# Patient Record
Sex: FEMALE | Race: WHITE | NOT HISPANIC OR LATINO
[De-identification: names, ages, dates, MRNs, and addresses within clinical notes are randomized per-mention and may not be internally consistent; named-entity substitution may affect disease eponyms.]

---

## 2017-08-23 ENCOUNTER — TRANSCRIPTION ENCOUNTER (OUTPATIENT)
Age: 47
End: 2017-08-23

## 2017-08-30 ENCOUNTER — RESULT REVIEW (OUTPATIENT)
Age: 47
End: 2017-08-30

## 2017-09-05 ENCOUNTER — EMERGENCY (EMERGENCY)
Facility: HOSPITAL | Age: 47
LOS: 1 days | End: 2017-09-05
Payer: COMMERCIAL

## 2017-09-05 PROCEDURE — 99285 EMERGENCY DEPT VISIT HI MDM: CPT | Mod: 25

## 2017-09-05 PROCEDURE — 74176 CT ABD & PELVIS W/O CONTRAST: CPT | Mod: 26

## 2018-09-10 ENCOUNTER — APPOINTMENT (OUTPATIENT)
Dept: OBGYN | Facility: CLINIC | Age: 48
End: 2018-09-10

## 2019-07-08 ENCOUNTER — TRANSCRIPTION ENCOUNTER (OUTPATIENT)
Age: 49
End: 2019-07-08

## 2019-10-28 ENCOUNTER — APPOINTMENT (OUTPATIENT)
Dept: ULTRASOUND IMAGING | Facility: CLINIC | Age: 49
End: 2019-10-28

## 2019-12-02 ENCOUNTER — APPOINTMENT (OUTPATIENT)
Dept: ORTHOPEDIC SURGERY | Facility: CLINIC | Age: 49
End: 2019-12-02
Payer: COMMERCIAL

## 2019-12-02 DIAGNOSIS — Z78.9 OTHER SPECIFIED HEALTH STATUS: ICD-10-CM

## 2019-12-02 DIAGNOSIS — Z72.3 LACK OF PHYSICAL EXERCISE: ICD-10-CM

## 2019-12-02 PROCEDURE — 99204 OFFICE O/P NEW MOD 45 MIN: CPT

## 2019-12-02 NOTE — PHYSICAL EXAM
[de-identified] : General: Alert and oriented x3. In no acute distress. Pleasant in nature with a normal affect. No apparent respiratory distress.\par L Ankle Exam\par Skin: Clean, dry, intact\par Inspection: No defect, No obvious malalignment, no swelling, no effusion; no lymphadenopathy\par Pulses: 2+ DP/PT pulses\par ROM: L Ankle neutral dorsiflexion, 40 degrees of plantarflexion, 10 degrees of subtalar motion\par Tenderness: +anterior lateral gutter, +mid substance Achilles, No tenderness over the lateral malleolus, no CFL/ATFL/PTFL pain. No medial malleolus pain, no deltoid ligament pain. No proximal fibular pain. No heel pain.\par Stability: 2+ anterior drawer\par Strength: 5/5 TA/GS/EHL\par Neuro: In tact to light touch throughout\par Additional tests: Negative Mortons test, Negative syndesmosis squeeze test. 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 \par \par \par  \par \par

## 2019-12-02 NOTE — DISCUSSION/SUMMARY
[de-identified] : Today I had a lengthy discussion with the patient regarding their left ankle pain. I have addressed all the patient's concerns surrounding the pathology of their condition. The patient's MRI was reviewed and all questions were answered. I recommend the patient undergo a course of physical therapy for the left ankle 2-3 times a week for a total of 6-8 weeks. A medical of medical necessity was given for physical therapy today. A discussion was had about an arthroscopy and ligament repair if her symptoms worsen or do not improve with conservative management alone. The patient was also advised to begin OTC dorsal hybrid night splint to facilitate stretching. I would like to see the patient back in the office in 2 months to reassess their condition. The patient understood and verbally agreed to the treatment plan. All of their questions were answered and they were satisfied with the visit. The patient should call the office if they have any questions or experience worsening symptoms.\par \par \par

## 2019-12-02 NOTE — ADDENDUM
[FreeTextEntry1] : Documented by Rashmi Rodriguez acting solely as a scribe for Dr. Wes Perry on this date 12/02/2019 .\par \par All medical record entries made by the Scribe were at my, Dr. Wes Perry, direction and personally dictated by me on 12/02/2019 . I have reviewed the chart and agree that the record accurately reflects my personal performance of the history, physical exam, assessment and plan. I have also personally directed, reviewed, and agreed with the chart.\par \par \par

## 2019-12-02 NOTE — HISTORY OF PRESENT ILLNESS
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 She notes that the night after the injury she was unable to place weight on the foot. She was two days later by Dr. Munoz where xrays were taken of the left ankle and she was placed into a brace full-time for 6 weeks. She then started physical therapy without much improvement and returned to Dr. Munoz where an MRI of the left ankle was ordered. She was then seen at [facility] in [facility] by Dr. Goodwin who stated that she needed surgical intervention. The patient’s pain is noted to be a 5-7/10. She describes the pain as achy and cramping in quality. She is here today for a second opinion. She has done 5 weeks of physical therapy since July 2019. She is currently taking no pain medication. No other complaints at this time.\par \par \par

## 2020-02-10 ENCOUNTER — APPOINTMENT (OUTPATIENT)
Dept: ORTHOPEDIC SURGERY | Facility: CLINIC | Age: 50
End: 2020-02-10
Payer: COMMERCIAL

## 2020-02-10 PROCEDURE — 99214 OFFICE O/P EST MOD 30 MIN: CPT

## 2020-02-23 DIAGNOSIS — Z01.818 ENCOUNTER FOR OTHER PREPROCEDURAL EXAMINATION: ICD-10-CM

## 2020-02-28 ENCOUNTER — EMERGENCY (EMERGENCY)
Facility: HOSPITAL | Age: 50
LOS: 1 days | End: 2020-02-28
Admitting: EMERGENCY MEDICINE
Payer: COMMERCIAL

## 2020-02-28 PROCEDURE — 99285 EMERGENCY DEPT VISIT HI MDM: CPT

## 2020-02-28 PROCEDURE — 71045 X-RAY EXAM CHEST 1 VIEW: CPT | Mod: 26

## 2020-03-06 NOTE — DISCUSSION/SUMMARY
[de-identified] : Today I had a lengthy discussion with the patient regarding her left ankle pain. I have addressed all the patient's concerns surrounding the pathology of her condition. A discussion was had about a left ankle arthroscopy and ligament repair surgery. A lengthy discussion was had about the surgery. All risks, benefits and alternatives to the recommended surgical procedure were discussed which include but are not limited to bleeding, infection, nerve damage, vascular damage, failure of the wound to heal, the need for further surgery, loss of limb, DVT, PE, loss of life as well as the risks associated with general anesthesia. The patient verbalized understanding and provided informed consent to move forward with surgery. The patient understood and verbally agreed to the treatment plan. All of her questions were answered and she was satisfied with the visit. The patient should call the office if she has any questions or experience worsening symptoms.

## 2020-03-06 NOTE — PHYSICAL EXAM
[de-identified] : General: Alert and oriented x3. In no acute distress. Pleasant in nature with a normal affect. No apparent respiratory distress.\par L Ankle Exam\par Skin: Clean, dry, intact\par Inspection: No defect, No obvious malalignment, no swelling, no effusion; no lymphadenopathy\par Pulses: 2+ DP/PT pulses\par ROM: L Ankle neutral dorsiflexion, 40 degrees of plantarflexion, 10 degrees of subtalar motion\par Tenderness: +anterior lateral gutter, +mid substance Achilles, No tenderness over the lateral malleolus, no CFL/ATFL/PTFL pain. No medial malleolus pain, no deltoid ligament pain. No proximal fibular pain. No heel pain.\par Stability: 2+ anterior drawer. Negative posterior drawer.\par Strength: 5/5 TA/GS/EHL\par Neuro: In tact to light touch throughout\par Additional tests: Negative Mortons test, Negative syndesmosis squeeze test. [de-identified] : None new obtained.

## 2020-03-06 NOTE — ADDENDUM
[FreeTextEntry1] : I, Russell Yeboah, acted solely as a scribe for Dr. Wes Perry on this date 02/10/2020. \par \par All medical record entries made by the Scribe were at my, Dr. Wes Perry, direction and personally dictated by me on 02/10/2020 . I have reviewed the chart and agree that the record accurately reflects my personal performance of the history, physical exam, assessment and plan. I have also personally directed, reviewed, and agreed with the chart.\par \par \par

## 2020-03-06 NOTE — HISTORY OF PRESENT ILLNESS
[FreeTextEntry1] : 49 year old female presenting with left ankle pain. The patient’s pain is noted to be a 8/10. The pain and swelling are both noted to be the same compared to the previous visit. The patient reports that she rolled her ankle twice since the last visit. Over the past three months, the patient has performed a 6 week home exercise program which consisted of strengthening and stretching, as well as 4 weeks of oral anti-inflammatory use and Tylenol without relief. The patient is here today after failing conservative management.  She is currently taking no pain medication. No other complaints at this time.

## 2020-03-10 NOTE — ASU PATIENT PROFILE, ADULT - PMH
Arthritis    Back pain    Cervical stenosis of spine    Diverticulitis    GERD (gastroesophageal reflux disease)    H/O degenerative disc disease    H/o Lyme disease    H/O radiculopathy    History of palpitations

## 2020-03-11 ENCOUNTER — APPOINTMENT (OUTPATIENT)
Dept: ORTHOPEDIC SURGERY | Facility: HOSPITAL | Age: 50
End: 2020-03-11

## 2020-03-11 ENCOUNTER — OUTPATIENT (OUTPATIENT)
Dept: INPATIENT UNIT | Facility: HOSPITAL | Age: 50
LOS: 1 days | Discharge: HOME | End: 2020-03-11
Payer: COMMERCIAL

## 2020-03-11 VITALS
OXYGEN SATURATION: 100 % | DIASTOLIC BLOOD PRESSURE: 83 MMHG | SYSTOLIC BLOOD PRESSURE: 129 MMHG | WEIGHT: 139.99 LBS | RESPIRATION RATE: 14 BRPM | HEART RATE: 84 BPM | TEMPERATURE: 98 F | HEIGHT: 61 IN

## 2020-03-11 VITALS
SYSTOLIC BLOOD PRESSURE: 110 MMHG | RESPIRATION RATE: 20 BRPM | DIASTOLIC BLOOD PRESSURE: 60 MMHG | OXYGEN SATURATION: 100 % | HEART RATE: 82 BPM

## 2020-03-11 DIAGNOSIS — S99.912D UNSPECIFIED INJURY OF LEFT ANKLE, SUBSEQUENT ENCOUNTER: ICD-10-CM

## 2020-03-11 DIAGNOSIS — M25.372 OTHER INSTABILITY, LEFT ANKLE: ICD-10-CM

## 2020-03-11 LAB — HCG UR QL: NEGATIVE — SIGNIFICANT CHANGE UP

## 2020-03-11 PROCEDURE — 29898 ANKLE ARTHROSCOPY/SURGERY: CPT | Mod: LT

## 2020-03-11 PROCEDURE — 27698 REPAIR OF ANKLE LIGAMENT: CPT | Mod: LT

## 2020-03-11 PROCEDURE — 81025 URINE PREGNANCY TEST: CPT

## 2020-03-11 PROCEDURE — C1713: CPT

## 2020-03-11 RX ORDER — OXYCODONE HYDROCHLORIDE 5 MG/1
5 TABLET ORAL ONCE
Refills: 0 | Status: DISCONTINUED | OUTPATIENT
Start: 2020-03-11 | End: 2020-03-11

## 2020-03-11 RX ORDER — FAMOTIDINE 10 MG/ML
20 INJECTION INTRAVENOUS ONCE
Refills: 0 | Status: COMPLETED | OUTPATIENT
Start: 2020-03-11 | End: 2020-03-11

## 2020-03-11 RX ORDER — ONDANSETRON 8 MG/1
4 TABLET, FILM COATED ORAL ONCE
Refills: 0 | Status: COMPLETED | OUTPATIENT
Start: 2020-03-11 | End: 2020-03-11

## 2020-03-11 RX ORDER — ACETAMINOPHEN 500 MG
975 TABLET ORAL ONCE
Refills: 0 | Status: COMPLETED | OUTPATIENT
Start: 2020-03-11 | End: 2020-03-11

## 2020-03-11 RX ORDER — OXYCODONE HYDROCHLORIDE 5 MG/1
1 TABLET ORAL
Qty: 18 | Refills: 0
Start: 2020-03-11 | End: 2020-03-13

## 2020-03-11 RX ORDER — HYDROMORPHONE HYDROCHLORIDE 2 MG/ML
0.5 INJECTION INTRAMUSCULAR; INTRAVENOUS; SUBCUTANEOUS
Refills: 0 | Status: DISCONTINUED | OUTPATIENT
Start: 2020-03-11 | End: 2020-03-11

## 2020-03-11 RX ORDER — FENTANYL CITRATE 50 UG/ML
50 INJECTION INTRAVENOUS
Refills: 0 | Status: DISCONTINUED | OUTPATIENT
Start: 2020-03-11 | End: 2020-03-11

## 2020-03-11 RX ORDER — ASPIRIN/CALCIUM CARB/MAGNESIUM 324 MG
1 TABLET ORAL
Qty: 30 | Refills: 0
Start: 2020-03-11 | End: 2020-04-09

## 2020-03-11 RX ORDER — SODIUM CHLORIDE 9 MG/ML
1000 INJECTION, SOLUTION INTRAVENOUS
Refills: 0 | Status: DISCONTINUED | OUTPATIENT
Start: 2020-03-11 | End: 2020-03-11

## 2020-03-11 RX ORDER — MECLIZINE HCL 12.5 MG
1 TABLET ORAL
Qty: 0 | Refills: 0 | DISCHARGE

## 2020-03-11 RX ADMIN — Medication 975 MILLIGRAM(S): at 13:49

## 2020-03-11 RX ADMIN — OXYCODONE HYDROCHLORIDE 5 MILLIGRAM(S): 5 TABLET ORAL at 19:18

## 2020-03-11 RX ADMIN — FENTANYL CITRATE 50 MICROGRAM(S): 50 INJECTION INTRAVENOUS at 19:17

## 2020-03-11 RX ADMIN — FENTANYL CITRATE 50 MICROGRAM(S): 50 INJECTION INTRAVENOUS at 19:45

## 2020-03-11 RX ADMIN — OXYCODONE HYDROCHLORIDE 5 MILLIGRAM(S): 5 TABLET ORAL at 19:45

## 2020-03-11 RX ADMIN — FAMOTIDINE 20 MILLIGRAM(S): 10 INJECTION INTRAVENOUS at 13:49

## 2020-03-11 RX ADMIN — ONDANSETRON 4 MILLIGRAM(S): 8 TABLET, FILM COATED ORAL at 19:45

## 2020-03-11 NOTE — ASU DISCHARGE PLAN (ADULT/PEDIATRIC) - ASU DC SPECIAL INSTRUCTIONSFT
Follow up with Dr. Perry in 7-10 days. Call office for appointment. Take medications as prescribed. Keep dressing clean, dry, and intact. Rest, ice, and elevate affected extremity. Non weight bearing Left lower extremity in splint. Keep splint clean and dry.

## 2020-03-11 NOTE — BRIEF OPERATIVE NOTE - NSICDXBRIEFPREOP_GEN_ALL_CORE_FT
PRE-OP DIAGNOSIS:  Sprain of anterior talofibular ligament, left, subsequent encounter 11-Mar-2020 19:26:19  Wilfred Farrar

## 2020-03-11 NOTE — ASU DISCHARGE PLAN (ADULT/PEDIATRIC) - CARE PROVIDER_API CALL
Wes Perry (DO)  Orthopaedic Surgery  [address] [place] [address]  Phone: [phone]  Fax: [phone]  Follow Up Time:

## 2020-03-11 NOTE — BRIEF OPERATIVE NOTE - NSICDXBRIEFPOSTOP_GEN_ALL_CORE_FT
POST-OP DIAGNOSIS:  Sprain of anterior talofibular ligament of left ankle, subsequent encounter 11-Mar-2020 19:26:32  Wilfred Farrar

## 2020-03-16 DIAGNOSIS — M24.272 DISORDER OF LIGAMENT, LEFT ANKLE: ICD-10-CM

## 2020-03-16 DIAGNOSIS — M25.872 OTHER SPECIFIED JOINT DISORDERS, LEFT ANKLE AND FOOT: ICD-10-CM

## 2020-03-16 DIAGNOSIS — M24.172 OTHER ARTICULAR CARTILAGE DISORDERS, LEFT ANKLE: ICD-10-CM

## 2020-03-16 DIAGNOSIS — Z87.891 PERSONAL HISTORY OF NICOTINE DEPENDENCE: ICD-10-CM

## 2020-03-16 DIAGNOSIS — Z91.018 ALLERGY TO OTHER FOODS: ICD-10-CM

## 2020-03-16 DIAGNOSIS — Z91.041 RADIOGRAPHIC DYE ALLERGY STATUS: ICD-10-CM

## 2020-03-16 DIAGNOSIS — M65.9 SYNOVITIS AND TENOSYNOVITIS, UNSPECIFIED: ICD-10-CM

## 2020-03-23 PROBLEM — Z86.69 PERSONAL HISTORY OF OTHER DISEASES OF THE NERVOUS SYSTEM AND SENSE ORGANS: Chronic | Status: ACTIVE | Noted: 2020-03-10

## 2020-03-23 PROBLEM — Z86.19 PERSONAL HISTORY OF OTHER INFECTIOUS AND PARASITIC DISEASES: Chronic | Status: ACTIVE | Noted: 2020-03-10

## 2020-03-23 PROBLEM — M48.02 SPINAL STENOSIS, CERVICAL REGION: Chronic | Status: ACTIVE | Noted: 2020-03-10

## 2020-03-23 PROBLEM — K57.92 DIVERTICULITIS OF INTESTINE, PART UNSPECIFIED, WITHOUT PERFORATION OR ABSCESS WITHOUT BLEEDING: Chronic | Status: ACTIVE | Noted: 2020-03-10

## 2020-03-23 PROBLEM — K21.9 GASTRO-ESOPHAGEAL REFLUX DISEASE WITHOUT ESOPHAGITIS: Chronic | Status: ACTIVE | Noted: 2020-03-10

## 2020-03-23 PROBLEM — Z87.898 PERSONAL HISTORY OF OTHER SPECIFIED CONDITIONS: Chronic | Status: ACTIVE | Noted: 2020-03-10

## 2020-03-23 PROBLEM — Z87.39 PERSONAL HISTORY OF OTHER DISEASES OF THE MUSCULOSKELETAL SYSTEM AND CONNECTIVE TISSUE: Chronic | Status: ACTIVE | Noted: 2020-03-10

## 2020-03-23 PROBLEM — M54.9 DORSALGIA, UNSPECIFIED: Chronic | Status: ACTIVE | Noted: 2020-03-10

## 2020-03-23 PROBLEM — M19.90 UNSPECIFIED OSTEOARTHRITIS, UNSPECIFIED SITE: Chronic | Status: ACTIVE | Noted: 2020-03-10

## 2020-03-25 ENCOUNTER — APPOINTMENT (OUTPATIENT)
Dept: ORTHOPEDIC SURGERY | Facility: CLINIC | Age: 50
End: 2020-03-25
Payer: COMMERCIAL

## 2020-03-25 PROCEDURE — 73610 X-RAY EXAM OF ANKLE: CPT | Mod: LT

## 2020-03-25 PROCEDURE — 97760 ORTHOTIC MGMT&TRAING 1ST ENC: CPT | Mod: 58

## 2020-03-25 PROCEDURE — 99024 POSTOP FOLLOW-UP VISIT: CPT

## 2020-03-25 NOTE — ADDENDUM
[FreeTextEntry1] : I, Leandro aMynard, acted solely as a scribe for Dr. Wes Perry on this date 03/25/2020 .\par All medical record entries made by the Scribe were at my, Dr. Wes Perry, direction and personally dictated by me on 03/25/2020 . I have reviewed the chart and agree that the record accurately reflects my personal performance of the history, physical exam, assessment and plan. I have also personally directed, reviewed, and agreed with the chart.

## 2020-03-25 NOTE — HISTORY OF PRESENT ILLNESS
[___ Weeks Post Op] : [unfilled] weeks post op [Clean/Dry/Intact] : clean, dry and intact [Healed] : healed [Neuro Intact] : an unremarkable neurological exam [Vascular Intact] : ~T peripheral vascular exam normal [Negative Berta's] : maneuvers demonstrated a negative Berta's sign [Doing Well] : is doing well [Excellent Pain Control] : has excellent pain control [No Sign of Infection] : is showing no signs of infection [Sutures Removed] : sutures were removed [Chills] : no chills [Fever] : no fever [Nausea] : no nausea [Vomiting] : no vomiting [Erythema] : not erythematous [Discharge] : absent of discharge [Swelling] : not swollen [Dehiscence] : not dehisced [de-identified] : s/p Left ankle arthroscopy with extensive synovectomy and debridement, lateral ligament repair.\par DOS 3/11/2020 [de-identified] : 49 year old female present in the office 2 weeks post op from Left ankle arthroscopy with extensive synovectomy and debridement, lateral ligament repair. The patient's pain is noted to be a 3/10. The patient presents ambulating in crutches. She is not currently taking any pain medication. She is taking 325 mg Aspirin for blood thinning purposes. No other complaints at this time.  [de-identified] : General: Alert and oriented x3. In no acute distress. Pleasant in nature with a normal affect. No apparent respiratory distress.\par The wound is intact. No evidence of any diastases or dehiscence. No surrounding erythema noted. No fluctuance. The area is warm and well perfused. The patient is able to wiggle their toes. Neurovascularly intact.  [de-identified] : 3V of the left ankle were ordered obtained and reviewed by me today, 03/25/2020 , revealed: No significant abnormality.  [de-identified] : Patient is a 49 year old female present in the office today 2 weeks s/p Left ankle arthroscopy with extensive synovectomy and debridement, lateral ligament repair. The sutures were removed in the office today. I advised the patient to continue taking aspirin for blood thinning purposes. I recommend that the patient utilize a CAM boot. The patient was fitted with a CAM boot in the office today. The patient was educated about the boot wear pattern and utilization, as well as the timeframe to come out of the boot. She was also given full instructions for using the boot. She was also shown an ASO brace for transitioning. I recommend that the patient utilize ice, NSAIDS PRN, and heat. They can also elevate their left ankle above the level of the heart. I recommend that the patient perform a home exercise program for the lower extremities. The patient was provided with the home exercise program in the office today. I would like to see the patient back in the office in 4-6 weeks to reassess their condition. I have addressed all the patient's concerns surrounding the pathology of their condition. The patient understood and verbally agreed to the treatment plan. All of their questions were answered and they were satisfied with the visit. The patient should call the office if they have any questions or experience worsening symptoms. \par \par ASA\par Boot --> ASO discussed\par HEP\par Ice\par Elevation\par ROM\par PT deferred due to Corona pandemic. \par F/U 4-6 weeks.

## 2020-04-06 ENCOUNTER — APPOINTMENT (OUTPATIENT)
Dept: ORTHOPEDIC SURGERY | Facility: CLINIC | Age: 50
End: 2020-04-06

## 2020-04-08 ENCOUNTER — TRANSCRIPTION ENCOUNTER (OUTPATIENT)
Age: 50
End: 2020-04-08

## 2020-04-20 ENCOUNTER — APPOINTMENT (OUTPATIENT)
Dept: ORTHOPEDIC SURGERY | Facility: CLINIC | Age: 50
End: 2020-04-20
Payer: COMMERCIAL

## 2020-04-20 PROCEDURE — 99024 POSTOP FOLLOW-UP VISIT: CPT

## 2020-04-20 NOTE — PHYSICAL EXAM
[de-identified] : General: Alert and oriented x3. In no acute distress. Pleasant in nature with a normal affect. No apparent respiratory distress. Normal mood. \par \par Video PE:\par The wound is intact. No evidence of any diastases or dehiscence. No surrounding erythema noted. No fluctuance. The patient is able to wiggle their toes. ROM of the ankle is 5-30.  Able to sense touch. Foot is normal color. The surgical incision site(s) was clean, dry and intact, healed, not erythematous, absent of discharge, not swollen and not dehisced. [de-identified] : None new.

## 2020-04-20 NOTE — DISCUSSION/SUMMARY
[de-identified] : Pt is >5 weeks from ankle scope and stabilization procedure\par Discontinue DVT ppx\par WBAT in boot --> ASO --> Sneaker\par Activity increase as able\par WBAT\par PT rx provided\par Strengthening exercises\par ROM\par F/U in office in 2-3 months. \par All questions answered.\par Patient satisfied with telehealth.

## 2020-04-20 NOTE — HISTORY OF PRESENT ILLNESS
[Home] : at home, [unfilled] , at the time of the visit. [Patient] : the patient [Self] : self [Medical Office: ([facility])___] : at the medical office located in  [FreeTextEntry1] : The patient is seenf or a telehealth visit.  She is postop from left ankle scope and brostrom procedure.  Doing well.  Hasn't gone to a PT outpatient session.  Has concerns about the boot and brace implementation.  She has been walking barefoot at times but with some slight discomfort.  She is apprehensive about her progress as she doesn't want to do any damage.  She inquired about her activity level.

## 2020-05-06 ENCOUNTER — APPOINTMENT (OUTPATIENT)
Dept: ORTHOPEDIC SURGERY | Facility: CLINIC | Age: 50
End: 2020-05-06

## 2020-06-23 ENCOUNTER — APPOINTMENT (OUTPATIENT)
Dept: ORTHOPEDIC SURGERY | Facility: CLINIC | Age: 50
End: 2020-06-23
Payer: COMMERCIAL

## 2020-06-23 PROCEDURE — 99213 OFFICE O/P EST LOW 20 MIN: CPT

## 2020-06-23 NOTE — PHYSICAL EXAM
[de-identified] : Left Ankle Physical Examination:\par \par General: Alert and oriented x3.  In no acute distress.  Pleasant in nature with a normal affect.  No apparent respiratory distress. \par Erythema, Warmth, Rubor: Negative\par Swelling: Mild swelling\par \par ROM:\par 1. Dorsiflexion: 0 degrees\par 2. Plantarflexion: 40 degrees\par 3. Inversion: 10 degrees\par 4. Eversion: 10 degrees\par \par Tenderness to Palpation: \par 1. Lateral Malleolus: Negative\par 2. Medial Malleolus: Negative\par 3. Proximal Fibular Pain: Negative\par 4. Heel Pain: Negative\par 5. Cuboid: Negative\par 6. Navicular: Negative\par 7. Tibiotalar Joint: Negative\par 8. Subtalar Joint: Negative\par 9. Posterior Recess: Negative\par \par Tendon Pain:\par 1. Achilles: Negative\par 2. Peroneals: Negative\par 3. Posterior Tibialis: Negative\par 4. Tibialis Anterior: Negative\par \par Ligament Pain:\par 1. ATFL: Slight tenderness to palpation.\par 2. CFL: Negative \par 3. PTFL: Negative\par 4. Deltoid Ligaments: Negative\par 5. Lis Franc Ligament: Negative\par \par Stability: \par 1. Anterior Drawer: Negative\par 2. Posterior Drawer: Negative\par \par Strength: 5/5 TA/GS/EHL\par \par Pulses: 2+ DP/PT Pulses\par \par Neuro: Intact motor and sensory\par \par Additional Test:\par 1. Calcaneal Squeeze Test: Negative\par 2. Syndesmosis Squeeze Test: Negative\par  [de-identified] : No x-rays performed today.

## 2020-06-23 NOTE — REASON FOR VISIT
[Initial Visit] : an initial visit for [FreeTextEntry2] : Left ankle pain s/p left ankle surgery 3/11/20

## 2020-06-23 NOTE — DISCUSSION/SUMMARY
[de-identified] : At this point in time I would like her to continue with outpatient physical therapy strictly working on strength and mobility. She has no restrictions in regards to strength and mobility. The patient will also continue with a home exercise and stretching program. She can refer to over-the-counter anti-inflammatories and Tylenol for pain. We will see her back in the office in 6-8 weeks to reevaluate motion and strength. All of her questions were answered.

## 2020-06-23 NOTE — HISTORY OF PRESENT ILLNESS
[FreeTextEntry1] : Xiomy is a 49-year-old female who presents for a followup of her left ankle status post left ankle surgery on 3/11/2020. Her main complaint is weakness and stiffness of the left ankle. During the Viral Pandemic she was unable to attend outpatient physical therapy but she tried to perform home exercise and stretching programs on her own. She started physical therapy outpatient 3 weeks ago. She denies locking or catching of the ankle. Her pain scale is 6/10. She states that she is 70% improved. She has no other issues.

## 2020-07-05 ENCOUNTER — TRANSCRIPTION ENCOUNTER (OUTPATIENT)
Age: 50
End: 2020-07-05

## 2020-08-24 ENCOUNTER — APPOINTMENT (OUTPATIENT)
Dept: ORTHOPEDIC SURGERY | Facility: CLINIC | Age: 50
End: 2020-08-24
Payer: COMMERCIAL

## 2020-08-24 PROCEDURE — 99213 OFFICE O/P EST LOW 20 MIN: CPT

## 2020-08-24 NOTE — DISCUSSION/SUMMARY
[de-identified] : Today I had a lengthy discussion with the patient regarding their left ankle pain. I have addressed all the patient's concerns surrounding the pathology of their condition. I recommend that the patient continue physical therapy. A new prescription was provided today. I advised the patient to utilize an OTC dorsal hybrid night splint to facilitate stretching in the evening. I would like to see the patient back in the office in 2-3 months to reassess their condition. The patient understood and verbally agreed to the treatment plan. All of their questions were answered and they were satisfied with the visit. The patient should call the office if they have any questions or experience worsening symptoms.

## 2020-08-24 NOTE — PHYSICAL EXAM
[de-identified] : Left Ankle Physical Examination:\par \par General: Alert and oriented x3.  In no acute distress.  Pleasant in nature with a normal affect.  No apparent respiratory distress. \par Erythema, Warmth, Rubor: Negative\par Swelling: Mild swelling\par \par ROM:\par 1. Dorsiflexion: 0 degrees\par 2. Plantarflexion: 40 degrees\par 3. Inversion: 10 degrees\par 4. Eversion: 10 degrees\par \par Tenderness to Palpation: \par 1. Lateral Malleolus: Negative\par 2. Medial Malleolus: Negative\par 3. Proximal Fibular Pain: Negative\par 4. Heel Pain: Negative\par 5. Cuboid: Negative\par 6. Navicular: Negative\par 7. Tibiotalar Joint: Negative\par 8. Subtalar Joint: Negative\par 9. Posterior Recess: Negative\par \par Tendon Pain:\par 1. Achilles: Negative\par 2. Peroneals: Negative\par 3. Posterior Tibialis: Negative\par 4. Tibialis Anterior: Negative\par \par Ligament Pain:\par 1. ATFL: Slight tenderness to palpation.\par 2. CFL: Negative \par 3. PTFL: Negative\par 4. Deltoid Ligaments: Negative\par 5. Lis Franc Ligament: Negative\par \par Stability: \par 1. Anterior Drawer: Negative\par 2. Posterior Drawer: Negative\par \par Strength: 5/5 TA/GS/EHL\par \par Pulses: 2+ DP/PT Pulses\par \par Neuro: Intact motor and sensory\par \par Additional Test:\par 1. Calcaneal Squeeze Test: Negative\par 2. Syndesmosis Squeeze Test: Negative\par  [de-identified] : None new obtained.

## 2020-08-24 NOTE — HISTORY OF PRESENT ILLNESS
[FreeTextEntry1] : 49 year old female presenting with left ankle pain. The patient’s pain is noted to be a 4/10. The pain is noted to be worse, and the swelling is noted to be 90% improved compared to the previous visit. The patient has been attending physical therapy for this issue. She reports having ankle stiffness in the morning and when she sits for too long. She presents today in flip flops. The patient also reports pain on the bottom of her foot. She is currently taking no pain medication. No other complaints at this time.

## 2020-11-02 ENCOUNTER — APPOINTMENT (OUTPATIENT)
Dept: ORTHOPEDIC SURGERY | Facility: CLINIC | Age: 50
End: 2020-11-02

## 2021-01-22 ENCOUNTER — TRANSCRIPTION ENCOUNTER (OUTPATIENT)
Age: 51
End: 2021-01-22

## 2021-01-25 ENCOUNTER — APPOINTMENT (OUTPATIENT)
Dept: ORTHOPEDIC SURGERY | Facility: CLINIC | Age: 51
End: 2021-01-25
Payer: COMMERCIAL

## 2021-01-25 DIAGNOSIS — M25.372 OTHER INSTABILITY, LEFT ANKLE: ICD-10-CM

## 2021-01-25 DIAGNOSIS — S99.912D UNSPECIFIED INJURY OF LEFT ANKLE, SUBSEQUENT ENCOUNTER: ICD-10-CM

## 2021-01-25 DIAGNOSIS — M25.672 STIFFNESS OF LEFT ANKLE, NOT ELSEWHERE CLASSIFIED: ICD-10-CM

## 2021-01-25 DIAGNOSIS — S93.492D SPRAIN OF OTHER LIGAMENT OF LEFT ANKLE, SUBSEQUENT ENCOUNTER: ICD-10-CM

## 2021-01-25 PROCEDURE — 99072 ADDL SUPL MATRL&STAF TM PHE: CPT

## 2021-01-25 PROCEDURE — 99213 OFFICE O/P EST LOW 20 MIN: CPT

## 2021-01-25 NOTE — ADDENDUM
[FreeTextEntry1] : I, Leandro Maynard, acted solely as a scribe for Dr. Wes Perry on this date 01/25/2021 .\par All medical record entries made by the Scribe were at my, Dr. Wes Perry, direction and personally dictated by me on 01/25/2021 . I have reviewed the chart and agree that the record accurately reflects my personal performance of the history, physical exam, assessment and plan. I have also personally directed, reviewed, and agreed with the chart.

## 2021-01-25 NOTE — HISTORY OF PRESENT ILLNESS
[FreeTextEntry1] : 50 year old female presenting with left ankle pain. The patient’s pain is noted to be a 1/10. The pain and swelling are noted to be 80% improved compared to the previous visit. She states the ankle is much more stable. The patient does c/o some clicking in the ankle. She does c/o some lateral ankle stiffness, and on the foot. She is currently taking no pain medication. No other complaints at this time.

## 2021-01-25 NOTE — DISCUSSION/SUMMARY
[de-identified] : Today I had a lengthy discussion with the patient regarding their left ankle pain. I have addressed all the patient's concerns surrounding the pathology of their condition. I would like to see the patient back in the office PRN to reassess their condition. The patient understood and verbally agreed to the treatment plan. All of their questions were answered and they were satisfied with the visit. The patient should call the office if they have any questions or experience worsening symptoms.

## 2021-01-25 NOTE — PHYSICAL EXAM
[de-identified] : General: Alert and oriented x3. In no acute distress. Pleasant in nature with a normal affect. No apparent respiratory distress.\par \par L Ankle Exam\par Skin: Clean, dry, intact\par Inspection: No obvious malalignment, no swelling, no effusion; no lymphadenopathy\par Pulses: 2+ DP/PT pulses\par ROM: L Ankle 10 degrees of dorsiflexion, 40 degrees of plantarflexion, 10 degrees of subtalar motion\par Tenderness: No tenderness over the lateral malleolus, no CFL/ATFL/PTFL pain. No medial malleolus pain, no deltoid ligament pain. No proximal fibular pain. No heel pain.\par Stability: Negative anterior/posterior drawer.\par Strength: 5/5 TA/GS/EHL\par Neuro: In tact to light touch throughout\par Additional tests: Negative Mortons test, Negative syndesmosis squeeze test.  [de-identified] : None new obtained.

## 2021-02-11 ENCOUNTER — TRANSCRIPTION ENCOUNTER (OUTPATIENT)
Age: 51
End: 2021-02-11

## 2021-04-22 ENCOUNTER — TRANSCRIPTION ENCOUNTER (OUTPATIENT)
Age: 51
End: 2021-04-22

## 2021-05-04 ENCOUNTER — TRANSCRIPTION ENCOUNTER (OUTPATIENT)
Age: 51
End: 2021-05-04

## 2021-05-13 ENCOUNTER — TRANSCRIPTION ENCOUNTER (OUTPATIENT)
Age: 51
End: 2021-05-13

## 2021-06-03 ENCOUNTER — TRANSCRIPTION ENCOUNTER (OUTPATIENT)
Age: 51
End: 2021-06-03

## 2021-09-27 ENCOUNTER — APPOINTMENT (OUTPATIENT)
Dept: CT IMAGING | Facility: CLINIC | Age: 51
End: 2021-09-27
Payer: COMMERCIAL

## 2021-09-27 PROCEDURE — 71250 CT THORAX DX C-: CPT

## 2021-10-20 ENCOUNTER — OUTPATIENT (OUTPATIENT)
Dept: OUTPATIENT SERVICES | Facility: HOSPITAL | Age: 51
LOS: 1 days | End: 2021-10-20

## 2021-10-27 ENCOUNTER — TRANSCRIPTION ENCOUNTER (OUTPATIENT)
Age: 51
End: 2021-10-27

## 2021-11-05 ENCOUNTER — APPOINTMENT (OUTPATIENT)
Dept: RADIOLOGY | Facility: CLINIC | Age: 51
End: 2021-11-05
Payer: COMMERCIAL

## 2021-11-05 ENCOUNTER — APPOINTMENT (OUTPATIENT)
Dept: ULTRASOUND IMAGING | Facility: CLINIC | Age: 51
End: 2021-11-05
Payer: COMMERCIAL

## 2021-11-05 ENCOUNTER — APPOINTMENT (OUTPATIENT)
Dept: MAMMOGRAPHY | Facility: CLINIC | Age: 51
End: 2021-11-05
Payer: COMMERCIAL

## 2021-11-05 PROCEDURE — 77080 DXA BONE DENSITY AXIAL: CPT

## 2021-11-05 PROCEDURE — 76641 ULTRASOUND BREAST COMPLETE: CPT | Mod: 50

## 2021-11-05 PROCEDURE — 77063 BREAST TOMOSYNTHESIS BI: CPT

## 2021-11-05 PROCEDURE — 77067 SCR MAMMO BI INCL CAD: CPT

## 2022-04-25 ENCOUNTER — APPOINTMENT (OUTPATIENT)
Dept: RADIOLOGY | Facility: CLINIC | Age: 52
End: 2022-04-25
Payer: COMMERCIAL

## 2022-04-25 PROCEDURE — 72100 X-RAY EXAM L-S SPINE 2/3 VWS: CPT

## 2022-04-25 PROCEDURE — 72040 X-RAY EXAM NECK SPINE 2-3 VW: CPT

## 2022-04-25 PROCEDURE — 72202 X-RAY EXAM SI JOINTS 3/> VWS: CPT

## 2022-04-25 PROCEDURE — 73521 X-RAY EXAM HIPS BI 2 VIEWS: CPT

## 2022-04-25 PROCEDURE — 73130 X-RAY EXAM OF HAND: CPT | Mod: 50

## 2022-08-16 NOTE — ASU PATIENT PROFILE, ADULT - PATIENT REPRESENTATIVE NAME
TAKE 1 TABLET EVERY DAY 90 tablet 0    furosemide (LASIX) 40 MG tablet Take 1 tablet by mouth daily 90 tablet 3    potassium chloride (KLOR-CON M) 20 MEQ extended release tablet Take 1 tablet by mouth daily 90 tablet 3    Alcohol Swabs (B-D SINGLE USE SWABS REGULAR) PADS 1 each by Does not apply route daily 100 each 5    Blood Glucose Calibration (TRUE METRIX LEVEL 1) Low SOLN 1 Bottle by In Vitro route every 30 days 1 each 5    blood glucose test strips (TRUE METRIX BLOOD GLUCOSE TEST) strip 1 each by In Vitro route daily As needed. 100 each 3    Blood Glucose Monitoring Suppl (TRUE METRIX METER) w/Device KIT 1 each by Does not apply route daily 100 kit 5    diclofenac sodium (VOLTAREN) 1 % GEL Apply 2 g topically 3 times daily as needed for Pain 2 g 0    atorvastatin (LIPITOR) 80 MG tablet TAKE 1 TABLET EVERY DAY 90 tablet 0    sodium chloride (OCEAN, BABY AYR) 0.65 % nasal spray 2 sprays by Nasal route as needed for Congestion 1 each 0    gabapentin (NEURONTIN) 300 MG capsule Take 2 capsules by mouth at bedtime for 90 days. 180 capsule 0    metoprolol tartrate (LOPRESSOR) 50 MG tablet Take 1 tablet by mouth 2 times daily (Patient taking differently: Take 50 mg by mouth in the morning.) 180 tablet 0    blood glucose monitor kit and supplies Test 1 time daily 1 kit 0    blood glucose monitor strips Test 1 time daily 50 strip 2    Lancets MISC 1 each by Does not apply route daily 50 each 5    aspirin 81 MG tablet Take 81 mg by mouth 2 times daily       isosorbide mononitrate (IMDUR) 30 MG CR tablet Take 30 mg by mouth daily       lisinopril (PRINIVIL;ZESTRIL) 20 MG tablet Take 20 mg by mouth daily        No current facility-administered medications on file prior to visit.      Social History     Tobacco Use    Smoking status: Former     Packs/day: 0.50     Years: 24.00     Pack years: 12.00     Types: Cigarettes     Quit date:      Years since quittin.6    Smokeless tobacco: Never   Substance Use Topics Alcohol use: Yes     Alcohol/week: 3.0 standard drinks     Types: 3 Glasses of wine per week    Drug use: No       ALLERGIES  Allergies   Allergen Reactions    Eggs Or Egg-Derived Products Nausea Only    Glucophage [Metformin]      diarrhea    Valium [Diazepam]     Zithromax [Azithromycin]          REVIEW OF SYSTEMS:  Review of Systems   Constitutional:  Negative for fever. HENT:  Negative for hearing loss. Eyes:  Negative for pain. Respiratory:  Negative for shortness of breath. Gastrointestinal:  Positive for constipation and diarrhea. Negative for nausea. Endocrine: Negative for heat intolerance. Genitourinary:  Negative for difficulty urinating. Musculoskeletal:  Positive for back pain. Negative for neck pain. Skin:  Positive for rash. Allergic/Immunologic: Negative for immunocompromised state. Neurological:  Negative for headaches. Hematological:  Bruises/bleeds easily. Psychiatric/Behavioral:  Negative for sleep disturbance. I have personally reviewed the PMH, PSH, family history, home medications, social history, allergies, ROS. Physical Exam:      Vitals:    08/18/22 1345   BP: 118/78   Site: Left Upper Arm   Temp: (!) 96.6 °F (35.9 °C)   TempSrc: Temporal   Weight: 184 lb (83.5 kg)   Height: 5' 0.5\" (1.537 m)     Body mass index is 35.34 kg/m². Physical Exam  Vitals and nursing note reviewed. Constitutional:       Appearance: Normal appearance. HENT:      Head: Normocephalic and atraumatic. Right Ear: External ear normal.      Left Ear: External ear normal.      Nose: Nose normal.      Mouth/Throat:      Pharynx: Oropharynx is clear. Eyes:      Extraocular Movements: Extraocular movements intact. Cardiovascular:      Pulses: Normal pulses. Pulmonary:      Effort: Pulmonary effort is normal.   Abdominal:      Palpations: Abdomen is soft. Genitourinary:     Rectum: Normal.   Musculoskeletal:         General: Normal range of motion.       Cervical back: Normal range of motion. Comments: Patient is walker dependent although can go short distances without the walker sometimes uses a cane. With minimal assistance can go up on her toes and her heels. A catch of pain when she flexes to about 40 degrees in the low back. Good strength and sensation of both lower extremities. Skin:     General: Skin is warm. Neurological:      General: No focal deficit present. Psychiatric:         Mood and Affect: Mood normal.        I have reviewed all laboratory studies, reports, data, and pertinent images. 5/26/2022 MRI lumbar spine  INDICATION: 73-year-old female, lumbar stenosis. COMPARISON: None. TECHNIQUE: Multiplanar, multisequence MR imaging of the lumbar spine was performed without No administration of intravenous contrast.       FINDINGS:       There is exaggeration of the normal lordosis of the columns of the lumbar spine visualized, grade 1 anterolisthesis of L5 over S1. Vertebral augmentation in the L1 vertebral body. Depression of the superior endplate of the L3 and L5 vertebral bodies is seen. Multilevel degenerative endplate changes is seen. The STIR sequence demonstrates unremarkable signal intensity of the bone marrow , no evidence of T2 prolongation within the marrow to suggest acute fracture, edema or infiltrative process. The cord terminates at the level of the L1-L2 intervertebral disc space, unremarkable signal intensity of internal cord, the terminal nerve fibers demonstrate unremarkable contour with no evidence of thickening or clumping. L1-L2 demonstrates degenerative disc changes with hypertrophic changes in the facet joints, no significant narrowing of the spinal canal is seen, with mild narrowing of bilateral neuroforamina. L2-L3 demonstrates degenerative disc changes.  Atrophic changes of the facet joints and ligamentum flavum, no significant narrowing of the spinal canal is visualized, mild narrowing of the right neural foramina and mild-to-moderate narrowing of the left    neural foramina is seen at this level. L3-L4 demonstrates degenerative disc with hypertrophic changes in the facet joints and ligamentum flavum, no significant narrowing of the spinal canal is visualized. Moderate to severe narrowing of the right neural foramina and moderate narrowing of the    left neural foramina is seen at this level. L4-L5 demonstrates extensive degenerative changes, hypertrophic changes in the facet joints with prominent hypertrophic changes in the ligamentum flavum, moderate to severe narrowing of the spinal canal with severe narrowing of the right neural foramina    and moderate to severe narrowing of the left neural foramina seen at this level. L5-S1 demonstrates degenerative changes with hypertrophic changes in the facet joints, no significant narrowing of the spinal canal is visualized at this level,  severe narrowing of the right neuroforamina and moderate to severe narrowing of the left    neural foramina is seen at this level. Impression   Extensive degenerative changes of the lumbar spine visualized most prominent at L4-L5 and L5-S1. HISTORY OF PRESENT ILLNESS:    Patient has had some gait issues and balance problems for his last several years. Walker dependent mainly because of her balance. Pain is a catcher pain sometimes when she bends forward but then if she stretches and moves is not too bad in her low back. She feels that she is functional and doing most of her activities. Advised when we consider risk versus benefits of decompressive surgery at this time she would not benefit from surgery.    patient is not a candidate for neuro spine surgery at this time all questions answered    Tish Rivera MD Leonardo Munoz /

## 2022-11-29 ENCOUNTER — APPOINTMENT (OUTPATIENT)
Dept: MAMMOGRAPHY | Facility: CLINIC | Age: 52
End: 2022-11-29

## 2022-11-29 ENCOUNTER — NON-APPOINTMENT (OUTPATIENT)
Age: 52
End: 2022-11-29

## 2022-12-04 ENCOUNTER — NON-APPOINTMENT (OUTPATIENT)
Age: 52
End: 2022-12-04

## 2023-01-31 ENCOUNTER — APPOINTMENT (OUTPATIENT)
Dept: ULTRASOUND IMAGING | Facility: CLINIC | Age: 53
End: 2023-01-31
Payer: COMMERCIAL

## 2023-01-31 ENCOUNTER — APPOINTMENT (OUTPATIENT)
Dept: MAMMOGRAPHY | Facility: CLINIC | Age: 53
End: 2023-01-31
Payer: COMMERCIAL

## 2023-01-31 PROCEDURE — 76642 ULTRASOUND BREAST LIMITED: CPT | Mod: LT

## 2023-01-31 PROCEDURE — 77065 DX MAMMO INCL CAD UNI: CPT | Mod: RT

## 2023-01-31 PROCEDURE — 77061 BREAST TOMOSYNTHESIS UNI: CPT

## 2023-04-08 ENCOUNTER — APPOINTMENT (OUTPATIENT)
Dept: MRI IMAGING | Facility: CLINIC | Age: 53
End: 2023-04-08
Payer: COMMERCIAL

## 2023-04-08 ENCOUNTER — APPOINTMENT (OUTPATIENT)
Dept: MRI IMAGING | Facility: CLINIC | Age: 53
End: 2023-04-08

## 2023-04-08 PROCEDURE — 73221 MRI JOINT UPR EXTREM W/O DYE: CPT | Mod: LT

## 2023-04-20 ENCOUNTER — APPOINTMENT (OUTPATIENT)
Dept: INFECTIOUS DISEASE | Facility: CLINIC | Age: 53
End: 2023-04-20
Payer: COMMERCIAL

## 2023-04-20 ENCOUNTER — LABORATORY RESULT (OUTPATIENT)
Age: 53
End: 2023-04-20

## 2023-04-20 VITALS
SYSTOLIC BLOOD PRESSURE: 108 MMHG | WEIGHT: 137 LBS | TEMPERATURE: 98.2 F | HEIGHT: 61 IN | BODY MASS INDEX: 25.86 KG/M2 | DIASTOLIC BLOOD PRESSURE: 64 MMHG

## 2023-04-20 DIAGNOSIS — Z86.19 PERSONAL HISTORY OF OTHER INFECTIOUS AND PARASITIC DISEASES: ICD-10-CM

## 2023-04-20 DIAGNOSIS — M25.552 PAIN IN RIGHT HIP: ICD-10-CM

## 2023-04-20 DIAGNOSIS — K58.9 IRRITABLE BOWEL SYNDROME W/OUT DIARRHEA: ICD-10-CM

## 2023-04-20 DIAGNOSIS — M25.512 PAIN IN RIGHT SHOULDER: ICD-10-CM

## 2023-04-20 DIAGNOSIS — Z86.16 PERSONAL HISTORY OF COVID-19: ICD-10-CM

## 2023-04-20 DIAGNOSIS — Z11.3 ENCOUNTER FOR SCREENING FOR INFECTIONS WITH A PREDOMINANTLY SEXUAL MODE OF TRANSMISSION: ICD-10-CM

## 2023-04-20 DIAGNOSIS — Z78.9 OTHER SPECIFIED HEALTH STATUS: ICD-10-CM

## 2023-04-20 DIAGNOSIS — M25.551 PAIN IN RIGHT HIP: ICD-10-CM

## 2023-04-20 DIAGNOSIS — N95.1 MENOPAUSAL AND FEMALE CLIMACTERIC STATES: ICD-10-CM

## 2023-04-20 DIAGNOSIS — M25.511 PAIN IN RIGHT SHOULDER: ICD-10-CM

## 2023-04-20 DIAGNOSIS — Z82.3 FAMILY HISTORY OF STROKE: ICD-10-CM

## 2023-04-20 DIAGNOSIS — Z83.511 FAMILY HISTORY OF GLAUCOMA: ICD-10-CM

## 2023-04-20 PROCEDURE — 99205 OFFICE O/P NEW HI 60 MIN: CPT

## 2023-04-20 RX ORDER — DOXYCYCLINE 100 MG/1
100 TABLET, FILM COATED ORAL
Qty: 56 | Refills: 0 | Status: ACTIVE | COMMUNITY
Start: 2023-04-20 | End: 1900-01-01

## 2023-04-20 RX ORDER — MELOXICAM 15 MG/1
15 TABLET ORAL
Qty: 30 | Refills: 2 | Status: DISCONTINUED | COMMUNITY
Start: 2020-06-23 | End: 2023-04-20

## 2023-04-20 NOTE — PHYSICAL EXAM
[General Appearance - Alert] : alert [General Appearance - In No Acute Distress] : in no acute distress [Sclera] : the sclera and conjunctiva were normal [PERRL With Normal Accommodation] : pupils were equal in size, round, reactive to light [Outer Ear] : the ears and nose were normal in appearance [Extraocular Movements] : extraocular movements were intact [Oropharynx] : the oropharynx was normal with no thrush [Neck Appearance] : the appearance of the neck was normal [Neck Cervical Mass (___cm)] : no neck mass was observed [Jugular Venous Distention Increased] : there was no jugular-venous distention [Thyroid Diffuse Enlargement] : the thyroid was not enlarged [Auscultation Breath Sounds / Voice Sounds] : lungs were clear to auscultation bilaterally [Heart Rate And Rhythm] : heart rate was normal and rhythm regular [Heart Sounds] : normal S1 and S2 [Heart Sounds Gallop] : no gallops [Murmurs] : no murmurs [Heart Sounds Pericardial Friction Rub] : no pericardial rub [Full Pulse] : the pedal pulses are present [Edema] : there was no peripheral edema [Bowel Sounds] : normal bowel sounds [Abdomen Soft] : soft [Abdomen Tenderness] : non-tender [Abdomen Mass (___ Cm)] : no abdominal mass palpated [Costovertebral Angle Tenderness] : no CVA tenderness [Musculoskeletal - Swelling] : no joint swelling [FreeTextEntry1] : She has good range of motion in her lower extremities including hip flexion her and hip extension.  In her upper extremities, there is been a decrease strength of 4 out of 5 in arm raising, flexing of the biceps and extension of the triceps  at 4 out of 5. [Skin Color & Pigmentation] : normal skin color and pigmentation [] : no rash [Cranial Nerves] : cranial nerves 2-12 were intact [No Focal Deficits] : no focal deficits [Sensation] : the sensory exam was normal to light touch and pinprick [Oriented To Time, Place, And Person] : oriented to person, place, and time [Affect] : the affect was normal

## 2023-04-20 NOTE — ASSESSMENT
[FreeTextEntry1] : This 52-year-old woman with no significant past medical history who is here for evaluation of about 1 year history of hip pain followed by shoulder pain.\par \par Prior outpatient work-up for mood rheumatological diseases have been unrevealing.  Patient did offer a history of having Lyme disease, also history of having COVID-19, x2.\par \par Unclear if her current presentation is related to those prior infectious diseases.  At the current time, I have offered to the patient to check a repeat set of labs, albeit limited.\par \par We will check, CBC CMP CRP ESR HIV screening Lyme vise screening and syphilis screen.\par \par I have agreed to a course of doxycycline 100 mg twice a day for 4 weeks.  I have informed the patient that longer courses of doxycycline even if its associated with some improvement in symptoms does not diagnose an illness.  I will call the patient once the results have been made available.\par \par

## 2023-04-20 NOTE — HISTORY OF PRESENT ILLNESS
[FreeTextEntry1] : This 52-year-old woman with no significant past medical history who is here for evaluation of about 1 year history of hip pain followed by shoulder pain.\par \par She had been previously healthy, reports that about 1 year ago she woke up and had bilateral hip pain where she had trouble standing up.  The pain went to the side of her hips and into the buttock.  There was difficulty in getting up from the sitting position.  She had limited range of motion where she could not make a wider stride when walking, and she cannot walk far away.  Then sometime after the hip pain started, she started having bilateral shoulder pain where she cannot reach behind her back, she can put her arms above her shoulders, and she had overall difficulty in mobility.\par \par .  Her primary care doctor check her for rheumatoid arthritis, and Lyme disease, but was told was negative.  Then she presented herself to a naturopathic doctor who found evidence of Lyme disease on a different lab test, she then brought this information to her primary care doctor who then gave her a 2-week course of doxycycline.\par She went to a rheumatologist, who checked her for multiple things, but was told that her labs were negative.  She is supposed to follow with a rheumatologist to have a discussion about possible starting prednisone.\par \par She has no injuries prior to this.  She does recall that she had COVID twice, the first time was about a year before she developed these pains.\par \par \par \par She is otherwise on no medications.  She does take a lot of supplements: Multivitamin for women, vitamin D, digestive enzymes for her intestinal issues, taking "acute", and anti-inflammatory supplement which contains turmeric and copper, taking magnesium and calcium supplements at night.  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  She denies cigarette use.  She is sexually active with men, her  only for the last 20 years.  She denies any ingram sex workers.  She has no recollection of having a sexually transmitted infections.  She does report that her naturopathic doctor told her that she had been detected herpes, she is not sure which kind.\par \par \par Family history significant for the following:\par father - CAD, stroke, older,  at 83 years\par mother - alive, hardening of ateries, HTN, glaucoma 83 years. \par siblings - brother - psoriatic arthritis.\par sister - healthy, HLD,\par children x 2 - doing well. \par \par

## 2023-04-20 NOTE — REASON FOR VISIT
[Consultation] : a consultation visit [FreeTextEntry1] : shoulder and hips inflammation, cant move them,

## 2023-04-20 NOTE — DATA REVIEWED
[FreeTextEntry1] : Labs were reviewed from December 2022 specifically said that the colitis 7/12/2022.  White blood cell count 7.15 hemoglobin 12.6 platelet count 296.  She had a rheumatoid factor that was less than 10, the CCP antibody IgG which was negative.  On the same date, her chemistries were also done sodium 142, potassium 4.6 chloride 101 bicarb 29 BUN of 16 creatinine 0.89 glucose of 107.  Her ALT was 19 AST was 21.  Her creatinine kinase was 72 her CCP number was less than 8 TSH was 1.66 and C-reactive protein less than 3.

## 2023-04-21 LAB
ALBUMIN SERPL ELPH-MCNC: 4.8 G/DL
ALP BLD-CCNC: 59 U/L
ALT SERPL-CCNC: 18 U/L
ANION GAP SERPL CALC-SCNC: 16 MMOL/L
AST SERPL-CCNC: 26 U/L
BASOPHILS # BLD AUTO: 0.05 K/UL
BASOPHILS NFR BLD AUTO: 0.7 %
BILIRUB SERPL-MCNC: 0.4 MG/DL
BUN SERPL-MCNC: 14 MG/DL
CALCIUM SERPL-MCNC: 9.8 MG/DL
CHLORIDE SERPL-SCNC: 102 MMOL/L
CO2 SERPL-SCNC: 22 MMOL/L
CREAT SERPL-MCNC: 0.84 MG/DL
CRP SERPL-MCNC: 5 MG/L
EGFR: 83 ML/MIN/1.73M2
EOSINOPHIL # BLD AUTO: 0.18 K/UL
EOSINOPHIL NFR BLD AUTO: 2.4 %
ERYTHROCYTE [SEDIMENTATION RATE] IN BLOOD BY WESTERGREN METHOD: 13 MM/HR
GLUCOSE SERPL-MCNC: 106 MG/DL
HCT VFR BLD CALC: 37.2 %
HGB BLD-MCNC: 12.3 G/DL
HIV1+2 AB SPEC QL IA.RAPID: NONREACTIVE
IMM GRANULOCYTES NFR BLD AUTO: 0.1 %
LYMPHOCYTES # BLD AUTO: 2.47 K/UL
LYMPHOCYTES NFR BLD AUTO: 32.9 %
MAN DIFF?: NORMAL
MCHC RBC-ENTMCNC: 31.1 PG
MCHC RBC-ENTMCNC: 33.1 GM/DL
MCV RBC AUTO: 94.2 FL
MONOCYTES # BLD AUTO: 0.37 K/UL
MONOCYTES NFR BLD AUTO: 4.9 %
NEUTROPHILS # BLD AUTO: 4.42 K/UL
NEUTROPHILS NFR BLD AUTO: 59 %
PLATELET # BLD AUTO: 196 K/UL
POTASSIUM SERPL-SCNC: 4.4 MMOL/L
PROT SERPL-MCNC: 6.7 G/DL
RBC # BLD: 3.95 M/UL
RBC # FLD: 11.9 %
SODIUM SERPL-SCNC: 141 MMOL/L
WBC # FLD AUTO: 7.5 K/UL

## 2023-04-24 PROBLEM — K58.9 IBS (IRRITABLE BOWEL SYNDROME): Status: ACTIVE | Noted: 2023-04-20

## 2023-04-24 PROBLEM — Z78.9 NEVER SMOKED CIGARETTES: Status: ACTIVE | Noted: 2023-04-24

## 2023-04-24 PROBLEM — N95.1 MENOPAUSAL SYMPTOMS: Status: ACTIVE | Noted: 2023-04-20

## 2023-04-24 PROBLEM — Z83.511 FAMILY HISTORY OF GLAUCOMA: Status: ACTIVE | Noted: 2023-04-24

## 2023-04-24 PROBLEM — Z82.3 FAMILY HISTORY OF CEREBROVASCULAR ACCIDENT (CVA): Status: ACTIVE | Noted: 2023-04-24

## 2023-04-24 PROBLEM — Z78.9 CONSUMES ALCOHOL: Status: ACTIVE | Noted: 2023-04-20

## 2023-04-24 PROBLEM — Z86.19 HISTORY OF LYME DISEASE: Status: RESOLVED | Noted: 2023-04-20 | Resolved: 2023-04-24

## 2023-04-24 PROBLEM — Z78.9 SOCIAL ALCOHOL USE: Status: ACTIVE | Noted: 2023-04-24

## 2023-04-25 LAB — T PALLIDUM AB SER QL IA: NEGATIVE

## 2023-08-28 ENCOUNTER — NON-APPOINTMENT (OUTPATIENT)
Age: 53
End: 2023-08-28

## 2024-03-13 ENCOUNTER — APPOINTMENT (OUTPATIENT)
Dept: ULTRASOUND IMAGING | Facility: CLINIC | Age: 54
End: 2024-03-13

## 2024-03-13 ENCOUNTER — APPOINTMENT (OUTPATIENT)
Dept: MAMMOGRAPHY | Facility: CLINIC | Age: 54
End: 2024-03-13
Payer: COMMERCIAL

## 2024-03-13 PROCEDURE — 77066 DX MAMMO INCL CAD BI: CPT

## 2024-03-13 PROCEDURE — 76642 ULTRASOUND BREAST LIMITED: CPT | Mod: RT

## 2024-03-13 PROCEDURE — 77062 BREAST TOMOSYNTHESIS BI: CPT

## 2024-03-21 ENCOUNTER — TRANSCRIPTION ENCOUNTER (OUTPATIENT)
Age: 54
End: 2024-03-21

## 2024-03-22 ENCOUNTER — TRANSCRIPTION ENCOUNTER (OUTPATIENT)
Age: 54
End: 2024-03-22

## 2024-06-03 ENCOUNTER — APPOINTMENT (OUTPATIENT)
Dept: CT IMAGING | Facility: CLINIC | Age: 54
End: 2024-06-03

## 2025-03-15 NOTE — ADDENDUM
[FreeTextEntry1] : I, Leandro Maynard, acted solely as a scribe for Dr. Wes Perry on this date 08/24/2020 .\par All medical record entries made by the Scribe were at my, Dr. Wes Perry, direction and personally dictated by me on 08/24/2020 . I have reviewed the chart and agree that the record accurately reflects my personal performance of the history, physical exam, assessment and plan. I have also personally directed, reviewed, and agreed with the chart.  copious irrigation